# Patient Record
Sex: FEMALE | Race: WHITE | NOT HISPANIC OR LATINO
[De-identification: names, ages, dates, MRNs, and addresses within clinical notes are randomized per-mention and may not be internally consistent; named-entity substitution may affect disease eponyms.]

---

## 2022-04-11 ENCOUNTER — APPOINTMENT (OUTPATIENT)
Dept: ORTHOPEDIC SURGERY | Facility: CLINIC | Age: 72
End: 2022-04-11
Payer: MEDICARE

## 2022-04-11 VITALS — HEIGHT: 66 IN | BODY MASS INDEX: 47.09 KG/M2 | WEIGHT: 293 LBS

## 2022-04-11 PROCEDURE — 99213 OFFICE O/P EST LOW 20 MIN: CPT | Mod: 25

## 2022-04-11 PROCEDURE — 20611 DRAIN/INJ JOINT/BURSA W/US: CPT

## 2022-04-11 NOTE — PROCEDURE
[Large Joint Injection] : Large joint injection [Left] : of the left [Knee] : knee [Pain] : pain [Inflammation] : inflammation [X-ray evidence of Osteoarthritis on this or prior visit] : x-ray evidence of Osteoarthritis on this or prior visit [Alcohol] : alcohol [Betadine] : betadine [Ethyl Chloride sprayed topically] : ethyl chloride sprayed topically [Sterile technique used] : sterile technique used [___ cc    1%] : Lidocaine ~Vcc of 1%  [] : Patient tolerated procedure well [Call if redness, pain or fever occur] : call if redness, pain or fever occur [Apply ice for 15min out of every hour for the next 12-24 hours as tolerated] : apply ice for 15 minutes out of every hour for the next 12-24 hours as tolerated [Patient was advised to rest the joint(s) for ____ days] : patient was advised to rest the joint(s) for [unfilled] days [Previous OTC use and PT nontherapeutic] : patient has tried OTC's including aspirin, Ibuprofen, Aleve, etc or prescription NSAIDS, and/or exercises at home and/or physical therapy without satisfactory response [Risks, benefits, alternatives discussed / Verbal consent obtained] : the risks benefits, and alternatives have been discussed, and verbal consent was obtained [Prior failure or difficult injection] : prior failure or difficult injection [Altered anatomic landmarks d/t erosive arthritis] : altered anatomic landmarks d/t erosive arthritis [All ultrasound images have been permanently captured and stored accordingly in our picture archiving and communication system] : All ultrasound images have been permanently captured and stored accordingly in our picture archiving and communication system [Visualization of the needle and placement of injection was performed without complication] : visualization of the needle and placement of injection was performed without complication

## 2022-04-14 NOTE — DISCUSSION/SUMMARY
[de-identified] : The documentation recorded by the scribe accurately reflects the service I personally performed and the decisions made by me.\par I, Randall Nguyen, attest that this documentation has been prepared under the direction and in the presence of Provider Ga Wolfe MD.\par \par

## 2022-04-14 NOTE — REASON FOR VISIT
[FreeTextEntry2] : 4/11/22: follow up after finishing Euflexxa series back in NOV 2021. Interested in steroid injection. Patient aware to soon to restart visco injections.

## 2022-04-14 NOTE — HISTORY OF PRESENT ILLNESS
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

## 2022-04-14 NOTE — ASSESSMENT
[FreeTextEntry1] : 11/15/21: Xray of the left knee reveals adv global OA. \par Discussed options, tried CSI injection with no prolonged relief. \par recommended gel injections for OA left knee. \par 1st injection of euflexxa left knee tolerated well. Post injection instructions given. \par RTO 1 wk\par \par 11/22/21: 2nd euflexxa left knee. PT tolerated well.\par 11/29/21: 3rd euflexxa left knee\par \par Treatment options discussed. \par 4/11/22: 1st steroid injection left knee, advised of increased sugars.\par Apply ice to affected area.\par Waiting to start gel in the following 2 months. \par Questions addressed. \par \par \par

## 2022-08-01 ENCOUNTER — APPOINTMENT (OUTPATIENT)
Dept: ORTHOPEDIC SURGERY | Facility: CLINIC | Age: 72
End: 2022-08-01

## 2022-08-01 VITALS — WEIGHT: 293 LBS | HEIGHT: 66 IN | BODY MASS INDEX: 47.09 KG/M2

## 2022-08-01 PROCEDURE — 20610 DRAIN/INJ JOINT/BURSA W/O US: CPT

## 2022-08-01 PROCEDURE — 99213 OFFICE O/P EST LOW 20 MIN: CPT | Mod: 25

## 2022-08-01 NOTE — DISCUSSION/SUMMARY
[de-identified] : The documentation recorded by the scribe accurately reflects the service I personally performed and the decisions made by me.\par I, Randall Nguyen, attest that this documentation has been prepared under the direction and in the presence of Provider Ga Wolfe MD.\par \par

## 2022-08-01 NOTE — PROCEDURE
[Large Joint Injection] : Large joint injection [Left] : of the left [Knee] : knee [Pain] : pain [X-ray evidence of Osteoarthritis on this or prior visit] : x-ray evidence of Osteoarthritis on this or prior visit [Euflexxa] : Euflexxa [#1] : series #1 [] : Patient tolerated procedure well [Call if redness, pain or fever occur] : call if redness, pain or fever occur [Apply ice for 15min out of every hour for the next 12-24 hours as tolerated] : apply ice for 15 minutes out of every hour for the next 12-24 hours as tolerated [Patient was advised to rest the joint(s) for ____ days] : patient was advised to rest the joint(s) for [unfilled] days [Patient had decreased mobility in the joint] : patient had decreased mobility in the joint [Risks, benefits, alternatives discussed / Verbal consent obtained] : the risks benefits, and alternatives have been discussed, and verbal consent was obtained [de-identified] :  The site was prepped with alcohol, betadine, ethyl chloride sprayed topically and sterile technique used.

## 2022-08-01 NOTE — ASSESSMENT
[FreeTextEntry1] : 11/15/21: Xray of the left knee reveals adv global OA. \par Discussed options, tried CSI injection with no prolonged relief. \par recommended gel injections for OA left knee. \par 1st injection of euflexxa left knee tolerated well. Post injection instructions given. \par RTO 1 wk\par \par 11/22/21: 2nd euflexxa left knee. PT tolerated well.\par 11/29/21: 3rd euflexxa left knee\par \par Treatment options discussed. \par 4/11/22: 1st steroid injection left knee, advised of increased sugars.\par Apply ice to affected area.\par Waiting to start gel in the following 2 months. \par Questions addressed. \par \par Restarting euflexxa#1 left knee tolerated well today. \par \par \par

## 2022-08-01 NOTE — HISTORY OF PRESENT ILLNESS
[Gradual] : gradual [Tightness] : tightness [Full time] : Work status: full time [8] : 8 [Localized] : localized [] : no [FreeTextEntry1] : left knee [FreeTextEntry9] : voltaren [de-identified] : weather [de-identified] : takes an occasional Advil. 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

## 2022-08-09 ENCOUNTER — APPOINTMENT (OUTPATIENT)
Dept: ORTHOPEDIC SURGERY | Facility: CLINIC | Age: 72
End: 2022-08-09

## 2022-08-09 VITALS — HEIGHT: 66 IN | WEIGHT: 293 LBS | BODY MASS INDEX: 47.09 KG/M2

## 2022-08-09 DIAGNOSIS — E11.9 TYPE 2 DIABETES MELLITUS W/OUT COMPLICATIONS: ICD-10-CM

## 2022-08-09 PROCEDURE — 20611 DRAIN/INJ JOINT/BURSA W/US: CPT

## 2022-08-11 NOTE — DISCUSSION/SUMMARY
[de-identified] : The natural progression of osteoarthritis was explained to the patient.  We discussed the possible treatment options from conservative to operative.  These included NSAIDS, Glucosamine and Chondrotin sulfate, and Physical Therapy as well different types of injections.  We also discussed that at some point they may progress to needed a TKA.  Information and pamphlets were given.\par \par We discussed their diagnosis and treatment options at length including surgical and non-surgical options. We will first attempt conservative treatment with activity modification, PT, icing, weight loss, and anti-inflammatory medications. We discussed the possible of injections (steroid and viscosupplementation) in the future. The patient was provided with a PT prescription to work on ROM, hip ER/abductors strengthening, quad/hamstring stretches and strengthening, and other exercises on the Knee Arthritis Protocol.\par \par Dx / Natural History:\par The patient was advised of the diagnosis.  The natural history of the pathology was explained in full to the patient in layman's terms.  Several different treatment options were discussed and explained in full to the patient including the risks and benefits of both surgical and non-surgical treatments.  All questions and concerns were answered. \par \par Pain Guide Activities:\par The patient was advised to let pain guide the gradual advancement of activities.\par \par Physical Therapy:\par The patient was provided with a prescription for Physical Therapy.\par \par Icing:\par The patient was advised to apply ice (wrapped in a towel or protective covering) to the area daily (20 minutes at a time, 2-4X/day).

## 2022-08-11 NOTE — PROCEDURE
[Large Joint Injection] : Large joint injection [Left] : of the left [Knee] : knee [Pain] : pain [X-ray evidence of Osteoarthritis on this or prior visit] : x-ray evidence of Osteoarthritis on this or prior visit [Euflexxa] : Euflexxa [#2] : series #2 [] : Patient tolerated procedure well [Call if redness, pain or fever occur] : call if redness, pain or fever occur [Apply ice for 15min out of every hour for the next 12-24 hours as tolerated] : apply ice for 15 minutes out of every hour for the next 12-24 hours as tolerated [Patient was advised to rest the joint(s) for ____ days] : patient was advised to rest the joint(s) for [unfilled] days [Patient had decreased mobility in the joint] : patient had decreased mobility in the joint [Risks, benefits, alternatives discussed / Verbal consent obtained] : the risks benefits, and alternatives have been discussed, and verbal consent was obtained [Morbid obesity] : morbid obesity [Prior failure or difficult injection] : prior failure or difficult injection [Altered anatomic landmarks d/t erosive arthritis] : altered anatomic landmarks d/t erosive arthritis [All ultrasound images have been permanently captured and stored accordingly in our picture archiving and communication system] : All ultrasound images have been permanently captured and stored accordingly in our picture archiving and communication system [Visualization of the needle and placement of injection was performed without complication] : visualization of the needle and placement of injection was performed without complication [de-identified] :  The site was prepped with alcohol, betadine, ethyl chloride sprayed topically and sterile technique used.

## 2022-08-11 NOTE — PHYSICAL EXAM
[Left] : left knee [de-identified] : The patient is a well appearing 72 year year old female of their stated age.\par Patient ambulates with a normal gait.\par \par General: in no acute distress, seated comfortably, moving easily\par Skin: No discoloration, rashes; on palpation skin is dry, \par Neuro: Normal sensation all dermatomes, motor all myotomes\par Vascular: Normal pulses, no edema, normal temperature\par Coordination and balance: Normal\par Psych: normal mood and affect, non pressured speech, alert and oriented x3 [] : no erythema [TWNoteComboBox7] : flexion 125 degrees

## 2022-08-16 ENCOUNTER — APPOINTMENT (OUTPATIENT)
Dept: ORTHOPEDIC SURGERY | Facility: CLINIC | Age: 72
End: 2022-08-16

## 2022-08-16 VITALS — BODY MASS INDEX: 47.09 KG/M2 | WEIGHT: 293 LBS | HEIGHT: 66 IN

## 2022-08-16 PROCEDURE — 99213 OFFICE O/P EST LOW 20 MIN: CPT | Mod: 25

## 2022-08-16 PROCEDURE — 20610 DRAIN/INJ JOINT/BURSA W/O US: CPT

## 2022-08-16 NOTE — HISTORY OF PRESENT ILLNESS
[de-identified] : Patient is presenting today for Euflexxa Injection #[3]. Pain and symptoms are improving. Patient has been exercising, and taking anti-inflammatories as needed. Patient denies any numbness/tingling/fevers/chills.

## 2022-08-16 NOTE — PHYSICAL EXAM
[de-identified] : The patient is a well appearing 72 year year old female of their stated age.\par Patient ambulates with a normal gait.\par \par General: in no acute distress, seated comfortably, moving easily\par Skin: No discoloration, rashes; on palpation skin is dry, \par Neuro: Normal sensation all dermatomes, motor all myotomes\par Vascular: Normal pulses, no edema, normal temperature\par Coordination and balance: Normal\par Psych: normal mood and affect, non pressured speech, alert and oriented x3 [Left] : left knee [] : patient ambulates without assistive device [TWNoteComboBox7] : flexion 125 degrees

## 2022-08-16 NOTE — DISCUSSION/SUMMARY
[de-identified] : Pt tolerated procedure well, f/u in 1 week for Euflexxa #[4] \par \par All of the patient's questions were answered to Her satisfaction. Diagnoses and potential treatments were reviewed. She agreed with the plan and would like to move forward with it. \par \par Curtis Longoria acting as scribe.

## 2022-08-16 NOTE — PROCEDURE
[Large Joint Injection] : Large joint injection [Left] : of the left [Knee] : knee [Pain] : pain [X-ray evidence of Osteoarthritis on this or prior visit] : x-ray evidence of Osteoarthritis on this or prior visit [Euflexxa] : Euflexxa [#3] : series #3 [] : Patient tolerated procedure well [Call if redness, pain or fever occur] : call if redness, pain or fever occur [Apply ice for 15min out of every hour for the next 12-24 hours as tolerated] : apply ice for 15 minutes out of every hour for the next 12-24 hours as tolerated [Patient was advised to rest the joint(s) for ____ days] : patient was advised to rest the joint(s) for [unfilled] days [Patient had decreased mobility in the joint] : patient had decreased mobility in the joint [Risks, benefits, alternatives discussed / Verbal consent obtained] : the risks benefits, and alternatives have been discussed, and verbal consent was obtained [Morbid obesity] : morbid obesity [Prior failure or difficult injection] : prior failure or difficult injection [Altered anatomic landmarks d/t erosive arthritis] : altered anatomic landmarks d/t erosive arthritis [All ultrasound images have been permanently captured and stored accordingly in our picture archiving and communication system] : All ultrasound images have been permanently captured and stored accordingly in our picture archiving and communication system [Visualization of the needle and placement of injection was performed without complication] : visualization of the needle and placement of injection was performed without complication [de-identified] :  The site was prepped with alcohol, betadine, ethyl chloride sprayed topically and sterile technique used.

## 2023-04-24 ENCOUNTER — APPOINTMENT (OUTPATIENT)
Dept: ORTHOPEDIC SURGERY | Facility: CLINIC | Age: 73
End: 2023-04-24
Payer: MEDICARE

## 2023-04-24 VITALS — BODY MASS INDEX: 47.09 KG/M2 | HEIGHT: 66 IN | WEIGHT: 293 LBS

## 2023-04-24 PROCEDURE — 99214 OFFICE O/P EST MOD 30 MIN: CPT | Mod: 25

## 2023-04-24 PROCEDURE — 20610 DRAIN/INJ JOINT/BURSA W/O US: CPT | Mod: 50

## 2023-04-24 PROCEDURE — 73564 X-RAY EXAM KNEE 4 OR MORE: CPT | Mod: FY,50

## 2023-04-24 RX ORDER — MELOXICAM 15 MG/1
15 TABLET ORAL
Refills: 0 | Status: ACTIVE | COMMUNITY

## 2023-04-24 NOTE — ASSESSMENT
[FreeTextEntry1] : 11/15/21: Xray of the left knee reveals adv global OA. \par Discussed options, tried CSI injection with no prolonged relief. \par recommended gel injections for OA left knee. \par 1st injection of euflexxa left knee tolerated well. Post injection instructions given. \par RTO 1 wk\par \par 11/22/21: 2nd euflexxa left knee. PT tolerated well.\par 11/29/21: 3rd euflexxa left knee\par \par Treatment options discussed. \par 4/11/22: 1st steroid injection left knee, advised of increased sugars.\par Apply ice to affected area.\par Waiting to start gel in the following 2 months. \par Questions addressed. \par \par 4/24/2023: X-rays today - moderate bilat tricomp OA with medial space narrowing\par Bilateral euflexxa#1 tolerated well today. \par Questions answered. \par Apply ice to the knee. \par \par \par

## 2023-04-24 NOTE — DISCUSSION/SUMMARY
[de-identified] : Progress note completed by ALIZA Delong under the direct supervision of Ga Wolfe M.D.\par

## 2023-04-24 NOTE — PHYSICAL EXAM
[Left] : left knee [Right] : right knee [NL (0)] : extension 0 degrees [] : light touch is intact throughout [TWNoteComboBox7] : flexion 120 degrees

## 2023-04-24 NOTE — HISTORY OF PRESENT ILLNESS
[8] : 8 [Dull/Aching] : dull/aching [Localized] : localized [Shooting] : shooting [Gradual] : gradual [de-identified] : 4/24/23: Follow up left knee. She completed Euflexxa injections (8/16/23) with moderate relief. Now notes similar symptoms in the right knee. No new injury. She was prescribed Mobic by her PCP with some relief. [] : no [FreeTextEntry5] : rt knee pain for a month , no injury [FreeTextEntry9] : voltaren or mobic [de-identified] : getting up from a seated position

## 2023-04-24 NOTE — PROCEDURE
[Large Joint Injection] : Large joint injection [Bilateral] : bilaterally of the [Knee] : knee [Pain] : pain [X-ray evidence of Osteoarthritis on this or prior visit] : x-ray evidence of Osteoarthritis on this or prior visit [Repeat series performed] : repeat series performed [Alcohol] : alcohol [Betadine] : betadine [Ethyl Chloride sprayed topically] : ethyl chloride sprayed topically [Sterile technique used] : sterile technique used [Euflexxa(20mg)] : 20mg of Euflexxa [#1] : series #1 [] : Patient tolerated procedure well [Call if redness, pain or fever occur] : call if redness, pain or fever occur [Apply ice for 15min out of every hour for the next 12-24 hours as tolerated] : apply ice for 15 minutes out of every hour for the next 12-24 hours as tolerated [Patient was advised to rest the joint(s) for ____ days] : patient was advised to rest the joint(s) for [unfilled] days [Patient had decreased mobility in the joint] : patient had decreased mobility in the joint [Risks, benefits, alternatives discussed / Verbal consent obtained] : the risks benefits, and alternatives have been discussed, and verbal consent was obtained [FreeTextEntry3] : Viscosupplementation Injection: X-ray evidence of osteoarthritis on this or prior visit and patient has tried OTC's including aspirin, Ibuprofen, Aleve etc or prescription NSAIDS, and/or exercises at home and/ or physical therapy without satisfactory response. \par \par An injection of Euflexxa 2.5ml #1 was injected into the bilateral knees after verbal consent obtained. \par \par Patient has tried OTC's including aspirin, Ibuprofen, Aleve etc or prescription NSAIDS, and/or exercises at home and/ or physical therapy without satisfactory response and Patient has decreased mobility in the joint. The risks, benefits, and alternatives to cortisone injection were explained in full to the patient. Risks outlined include but are not limited to infection, sepsis, bleeding, scarring, skin discoloration, temporary increase in pain, syncopal episode, failure to resolve symptoms, allergic reaction, and symptom recurrence. Patient understands the risks. All questions were answered. After discussion of options, patient requested an injection. Oral informed consent was obtained. Sterile technique was utilized for the procedure including the preparation of the solutions used for the injection. Injection site was prepped with betadine and alcohol. Ethyl chloride sprayed topically. Sterile technique used. Patient tolerated procedure well. \par \par Post Procedure Instructions: Patient was advised to call if redness, pain, or fever occur. Apply ice for 15 min. every 2 hours for the next 12-24 hours as tolerated. Patient was advised to rest the joint(s) for 1-2 days.\par

## 2023-05-01 ENCOUNTER — APPOINTMENT (OUTPATIENT)
Dept: ORTHOPEDIC SURGERY | Facility: CLINIC | Age: 73
End: 2023-05-01
Payer: MEDICARE

## 2023-05-01 VITALS — HEIGHT: 66 IN | BODY MASS INDEX: 47.09 KG/M2 | WEIGHT: 293 LBS

## 2023-05-01 PROCEDURE — 99024 POSTOP FOLLOW-UP VISIT: CPT

## 2023-05-01 PROCEDURE — 20610 DRAIN/INJ JOINT/BURSA W/O US: CPT | Mod: 50

## 2023-05-01 NOTE — DISCUSSION/SUMMARY
[de-identified] : Progress note completed by ALIZA Delong under the direct supervision of Ga Wolfe M.D.\par

## 2023-05-01 NOTE — ASSESSMENT
[FreeTextEntry1] : 11/15/21: Xray of the left knee reveals adv global OA. \par Discussed options, tried CSI injection with no prolonged relief. \par recommended gel injections for OA left knee. \par 1st injection of euflexxa left knee tolerated well. Post injection instructions given. \par RTO 1 wk\par \par 11/22/21: 2nd euflexxa left knee. PT tolerated well.\par 11/29/21: 3rd euflexxa left knee\par \par Treatment options discussed. \par 4/11/22: 1st steroid injection left knee, advised of increased sugars.\par Apply ice to affected area.\par Waiting to start gel in the following 2 months. \par Questions addressed. \par \par 4/24/2023: X-rays today - moderate bilat tricomp OA with medial space narrowing\par Bilateral euflexxa#1 tolerated well today. \par Questions answered. \par Apply ice to the knee. \par \par 5/1/23: Euflexxa #2 bilateral knees tolerated well.\par RTO in 1 week to continue.\par \par \par

## 2023-05-01 NOTE — HISTORY OF PRESENT ILLNESS
[Gradual] : gradual [8] : 8 [Dull/Aching] : dull/aching [Localized] : localized [Meds] : meds [Walking/activity] : walking/activity [Sitting] : sitting [Standing] : standing [Walking] : walking [Stairs] : stairs [2] : 2 [Euflexxa] : Euflexxa [de-identified] : 5/1/23: Euflexxa #2 bilateral knees. Symptoms continue. Denies complication with prior injection.\par \par 4/24/23: Follow up left knee. She completed Euflexxa injections (8/16/23) with moderate relief. Now notes similar symptoms in the right knee. No new injury. She was prescribed Mobic by her PCP with some relief. [] : no [FreeTextEntry1] : bilat knees [FreeTextEntry5] : rt knee pain for a month , no injury [FreeTextEntry9] : voltaren  [de-identified] : getting up from a seated position [de-identified] : 4-24-23 [de-identified] : knees

## 2023-05-01 NOTE — PHYSICAL EXAM
[Left] : left knee [Right] : right knee [NL (0)] : extension 0 degrees [] : no erythema [TWNoteComboBox7] : flexion 120 degrees

## 2023-05-08 ENCOUNTER — APPOINTMENT (OUTPATIENT)
Dept: ORTHOPEDIC SURGERY | Facility: CLINIC | Age: 73
End: 2023-05-08
Payer: MEDICARE

## 2023-05-08 VITALS — BODY MASS INDEX: 47.09 KG/M2 | WEIGHT: 293 LBS | HEIGHT: 66 IN

## 2023-05-08 DIAGNOSIS — M17.11 UNILATERAL PRIMARY OSTEOARTHRITIS, RIGHT KNEE: ICD-10-CM

## 2023-05-08 DIAGNOSIS — M17.12 UNILATERAL PRIMARY OSTEOARTHRITIS, LEFT KNEE: ICD-10-CM

## 2023-05-08 PROCEDURE — 20610 DRAIN/INJ JOINT/BURSA W/O US: CPT | Mod: 50

## 2023-05-08 PROCEDURE — 99213 OFFICE O/P EST LOW 20 MIN: CPT | Mod: 25

## 2023-05-08 NOTE — HISTORY OF PRESENT ILLNESS
[3] : 3 [Euflexxa] : Euflexxa [8] : 8 [Localized] : localized [Walking/activity] : walking/activity [Nothing helps with pain getting better] : Nothing helps with pain getting better [Stairs] : stairs [de-identified] : 5/8/23: Euflexxa #3 bilateral knees. Mild improvement after the last inj\par \par 5/1/23: Euflexxa #2 bilateral knees. Symptoms continue. Denies complication with prior injection.\par \par 4/24/23: Follow up left knee. She completed Euflexxa injections (8/16/23) with moderate relief. Now notes similar symptoms in the right knee. No new injury. She was prescribed Mobic by her PCP with some relief. [] : no [FreeTextEntry1] : knees [de-identified] : 5/1/2023 [de-identified] : B/L knees

## 2023-05-08 NOTE — ASSESSMENT
[FreeTextEntry1] : 11/15/21: Xray of the left knee reveals adv global OA. \par Discussed options, tried CSI injection with no prolonged relief. \par recommended gel injections for OA left knee. \par 1st injection of euflexxa left knee tolerated well. Post injection instructions given. \par RTO 1 wk\par \par 11/22/21: 2nd euflexxa left knee. PT tolerated well.\par 11/29/21: 3rd euflexxa left knee\par \par Treatment options discussed. \par 4/11/22: 1st steroid injection left knee, advised of increased sugars.\par Apply ice to affected area.\par Waiting to start gel in the following 2 months. \par Questions addressed. \par \par 4/24/2023: X-rays today - moderate bilat tricomp OA with medial space narrowing\par Bilateral euflexxa#1 tolerated well today. \par \par 5/1/23: Euflexxa #2 bilateral knees tolerated well.\par \par 05/08/2023 euflexxa #3 B/L knees\par Apply ice to affected area.\par RTO in 6 months or greater to repeat series. \par \par Progress note completed by Elena Smith PA-C\par The PA-C assigned on this date saw this patient independently.  I have reviewed the note and agree with the treatment provided. - Dr. Wolfe

## 2023-05-08 NOTE — PROCEDURE
[FreeTextEntry3] : Viscosupplementation Injection: X-ray evidence of osteoarthritis on this or prior visit and patient has tried OTC's including aspirin, Ibuprofen, Aleve etc or prescription NSAIDS, and/or exercises at home and/ or physical therapy without satisfactory response. \par \par An injection of Euflexxa 2.5ml #3 was injected into the bilateral knees after verbal consent obtained. \par \par Patient has tried OTC's including aspirin, Ibuprofen, Aleve etc or prescription NSAIDS, and/or exercises at home and/ or physical therapy without satisfactory response and Patient has decreased mobility in the joint. The risks, benefits, and alternatives to cortisone injection were explained in full to the patient. Risks outlined include but are not limited to infection, sepsis, bleeding, scarring, skin discoloration, temporary increase in pain, syncopal episode, failure to resolve symptoms, allergic reaction, and symptom recurrence. Patient understands the risks. All questions were answered. After discussion of options, patient requested an injection. Oral informed consent was obtained. Sterile technique was utilized for the procedure including the preparation of the solutions used for the injection. Injection site was prepped with betadine and alcohol. Ethyl chloride sprayed topically. Sterile technique used. Patient tolerated procedure well. \par \par Post Procedure Instructions: Patient was advised to call if redness, pain, or fever occur. Apply ice for 15 min. every 2 hours for the next 12-24 hours as tolerated. Patient was advised to rest the joint(s) for 1-2 days.\par

## 2024-10-30 ENCOUNTER — APPOINTMENT (OUTPATIENT)
Dept: ORTHOPEDIC SURGERY | Facility: CLINIC | Age: 74
End: 2024-10-30
Payer: MEDICARE

## 2024-10-30 VITALS — HEIGHT: 66 IN | BODY MASS INDEX: 45.8 KG/M2 | WEIGHT: 285 LBS

## 2024-10-30 DIAGNOSIS — M41.50 OTHER SECONDARY SCOLIOSIS, SITE UNSPECIFIED: ICD-10-CM

## 2024-10-30 DIAGNOSIS — M54.16 RADICULOPATHY, LUMBAR REGION: ICD-10-CM

## 2024-10-30 PROCEDURE — 99213 OFFICE O/P EST LOW 20 MIN: CPT

## 2024-10-30 PROCEDURE — 72170 X-RAY EXAM OF PELVIS: CPT

## 2024-10-30 PROCEDURE — 72100 X-RAY EXAM L-S SPINE 2/3 VWS: CPT

## 2024-10-30 RX ORDER — AMLODIPINE BESYLATE 5 MG/1
5 TABLET ORAL
Refills: 0 | Status: ACTIVE | COMMUNITY

## 2024-10-30 RX ORDER — CARVEDILOL 12.5 MG/1
12.5 TABLET, FILM COATED ORAL
Refills: 0 | Status: ACTIVE | COMMUNITY

## 2024-10-30 RX ORDER — ESCITALOPRAM OXALATE 5 MG/1
5 TABLET, FILM COATED ORAL
Refills: 0 | Status: ACTIVE | COMMUNITY

## 2024-10-30 RX ORDER — LOSARTAN POTASSIUM 50 MG/1
50 TABLET, FILM COATED ORAL
Refills: 0 | Status: ACTIVE | COMMUNITY

## 2025-02-26 ENCOUNTER — APPOINTMENT (OUTPATIENT)
Dept: ORTHOPEDIC SURGERY | Facility: CLINIC | Age: 75
End: 2025-02-26
Payer: MEDICARE

## 2025-02-26 VITALS — WEIGHT: 285 LBS | HEIGHT: 66 IN | BODY MASS INDEX: 45.8 KG/M2

## 2025-02-26 DIAGNOSIS — R29.818 OTHER SYMPTOMS AND SIGNS INVOLVING THE NERVOUS SYSTEM: ICD-10-CM

## 2025-02-26 DIAGNOSIS — M54.16 RADICULOPATHY, LUMBAR REGION: ICD-10-CM

## 2025-02-26 DIAGNOSIS — M41.50 OTHER SECONDARY SCOLIOSIS, SITE UNSPECIFIED: ICD-10-CM

## 2025-02-26 DIAGNOSIS — E66.01 MORBID (SEVERE) OBESITY DUE TO EXCESS CALORIES: ICD-10-CM

## 2025-02-26 PROCEDURE — 99213 OFFICE O/P EST LOW 20 MIN: CPT

## 2025-03-08 ENCOUNTER — RESULT REVIEW (OUTPATIENT)
Age: 75
End: 2025-03-08

## 2025-03-26 ENCOUNTER — APPOINTMENT (OUTPATIENT)
Dept: ORTHOPEDIC SURGERY | Facility: CLINIC | Age: 75
End: 2025-03-26

## 2025-03-26 VITALS — WEIGHT: 285 LBS | BODY MASS INDEX: 45.8 KG/M2 | HEIGHT: 66 IN

## 2025-03-26 DIAGNOSIS — M47.816 SPONDYLOSIS W/OUT MYELOPATHY OR RADICULOPATHY, LUMBAR REGION: ICD-10-CM

## 2025-03-26 DIAGNOSIS — M41.50 OTHER SECONDARY SCOLIOSIS, SITE UNSPECIFIED: ICD-10-CM

## 2025-03-26 DIAGNOSIS — E66.01 MORBID (SEVERE) OBESITY DUE TO EXCESS CALORIES: ICD-10-CM

## 2025-03-26 DIAGNOSIS — M54.16 RADICULOPATHY, LUMBAR REGION: ICD-10-CM

## 2025-03-26 DIAGNOSIS — M48.061 SPINAL STENOSIS, LUMBAR REGION WITHOUT NEUROGENIC CLAUDICATION: ICD-10-CM

## 2025-03-26 PROCEDURE — 99214 OFFICE O/P EST MOD 30 MIN: CPT

## 2025-04-17 ENCOUNTER — APPOINTMENT (OUTPATIENT)
Dept: PAIN MANAGEMENT | Facility: CLINIC | Age: 75
End: 2025-04-17
Payer: MEDICARE

## 2025-04-17 VITALS — HEIGHT: 66 IN | WEIGHT: 285 LBS | BODY MASS INDEX: 45.8 KG/M2

## 2025-04-17 DIAGNOSIS — M54.16 RADICULOPATHY, LUMBAR REGION: ICD-10-CM

## 2025-04-17 PROCEDURE — 99204 OFFICE O/P NEW MOD 45 MIN: CPT

## 2025-05-08 ENCOUNTER — APPOINTMENT (OUTPATIENT)
Dept: INFECTIOUS DISEASE | Facility: CLINIC | Age: 75
End: 2025-05-08

## 2025-05-08 VITALS
SYSTOLIC BLOOD PRESSURE: 120 MMHG | HEIGHT: 66 IN | TEMPERATURE: 97.3 F | WEIGHT: 250 LBS | BODY MASS INDEX: 40.18 KG/M2 | DIASTOLIC BLOOD PRESSURE: 70 MMHG

## 2025-05-08 VITALS — OXYGEN SATURATION: 93 % | HEART RATE: 81 BPM

## 2025-05-08 DIAGNOSIS — M46.40 DISCITIS, UNSPECIFIED, SITE UNSPECIFIED: ICD-10-CM

## 2025-05-08 DIAGNOSIS — M54.16 RADICULOPATHY, LUMBAR REGION: ICD-10-CM

## 2025-05-08 DIAGNOSIS — R93.7 ABNORMAL FINDINGS ON DIAGNOSTIC IMAGING OF OTHER PARTS OF MUSCULOSKELETAL SYSTEM: ICD-10-CM

## 2025-05-08 PROCEDURE — G2211 COMPLEX E/M VISIT ADD ON: CPT

## 2025-05-08 PROCEDURE — 99205 OFFICE O/P NEW HI 60 MIN: CPT

## 2025-05-14 LAB
ALBUMIN SERPL ELPH-MCNC: 4 G/DL
ALP BLD-CCNC: 109 U/L
ALT SERPL-CCNC: 30 U/L
ANION GAP SERPL CALC-SCNC: 15 MMOL/L
AST SERPL-CCNC: 24 U/L
BACTERIA BLD CULT: NORMAL
BACTERIA BLD CULT: NORMAL
BASOPHILS # BLD AUTO: 0.02 K/UL
BASOPHILS NFR BLD AUTO: 0.4 %
BILIRUB SERPL-MCNC: 0.4 MG/DL
BUN SERPL-MCNC: 44 MG/DL
CALCIUM SERPL-MCNC: 9.3 MG/DL
CHLORIDE SERPL-SCNC: 104 MMOL/L
CO2 SERPL-SCNC: 20 MMOL/L
CREAT SERPL-MCNC: 1.66 MG/DL
CRP SERPL-MCNC: 9 MG/L
EGFRCR SERPLBLD CKD-EPI 2021: 32 ML/MIN/1.73M2
EOSINOPHIL # BLD AUTO: 0.08 K/UL
EOSINOPHIL NFR BLD AUTO: 1.5 %
ERYTHROCYTE [SEDIMENTATION RATE] IN BLOOD BY WESTERGREN METHOD: 50 MM/HR
HCT VFR BLD CALC: 38.5 %
HGB BLD-MCNC: 12.3 G/DL
IMM GRANULOCYTES NFR BLD AUTO: 0.4 %
LYMPHOCYTES # BLD AUTO: 0.73 K/UL
LYMPHOCYTES NFR BLD AUTO: 13.7 %
MAN DIFF?: NORMAL
MCHC RBC-ENTMCNC: 31.1 PG
MCHC RBC-ENTMCNC: 31.9 G/DL
MCV RBC AUTO: 97.2 FL
MONOCYTES # BLD AUTO: 0.41 K/UL
MONOCYTES NFR BLD AUTO: 7.7 %
NEUTROPHILS # BLD AUTO: 4.05 K/UL
NEUTROPHILS NFR BLD AUTO: 76.3 %
PLATELET # BLD AUTO: 195 K/UL
POTASSIUM SERPL-SCNC: 5 MMOL/L
PROCALCITONIN SERPL-MCNC: 0.08 NG/ML
PROT SERPL-MCNC: 6.5 G/DL
RBC # BLD: 3.96 M/UL
RBC # FLD: 14.2 %
SODIUM SERPL-SCNC: 139 MMOL/L
WBC # FLD AUTO: 5.31 K/UL

## 2025-05-15 ENCOUNTER — APPOINTMENT (OUTPATIENT)
Dept: PAIN MANAGEMENT | Facility: CLINIC | Age: 75
End: 2025-05-15

## 2025-05-22 ENCOUNTER — APPOINTMENT (OUTPATIENT)
Dept: NUCLEAR MEDICINE | Facility: CLINIC | Age: 75
End: 2025-05-22
Payer: MEDICARE

## 2025-05-22 ENCOUNTER — OUTPATIENT (OUTPATIENT)
Dept: OUTPATIENT SERVICES | Facility: HOSPITAL | Age: 75
LOS: 1 days | End: 2025-05-22

## 2025-05-22 DIAGNOSIS — R93.7 ABNORMAL FINDINGS ON DIAGNOSTIC IMAGING OF OTHER PARTS OF MUSCULOSKELETAL SYSTEM: ICD-10-CM

## 2025-05-22 PROCEDURE — 78816 PET IMAGE W/CT FULL BODY: CPT | Mod: 26,PI

## 2025-05-22 PROCEDURE — 78816 PET IMAGE W/CT FULL BODY: CPT | Mod: 26

## 2025-06-05 ENCOUNTER — APPOINTMENT (OUTPATIENT)
Dept: PAIN MANAGEMENT | Facility: CLINIC | Age: 75
End: 2025-06-05
Payer: MEDICARE

## 2025-06-05 VITALS — HEIGHT: 66 IN | WEIGHT: 250 LBS | BODY MASS INDEX: 40.18 KG/M2

## 2025-06-05 PROCEDURE — G2211 COMPLEX E/M VISIT ADD ON: CPT

## 2025-06-05 PROCEDURE — 99214 OFFICE O/P EST MOD 30 MIN: CPT

## 2025-06-09 ENCOUNTER — APPOINTMENT (OUTPATIENT)
Dept: PAIN MANAGEMENT | Facility: CLINIC | Age: 75
End: 2025-06-09
Payer: MEDICARE

## 2025-06-09 PROCEDURE — 64484 NJX AA&/STRD TFRM EPI L/S EA: CPT | Mod: RT,59

## 2025-06-09 PROCEDURE — 64483 NJX AA&/STRD TFRM EPI L/S 1: CPT | Mod: RT

## 2025-06-27 ENCOUNTER — APPOINTMENT (OUTPATIENT)
Dept: PAIN MANAGEMENT | Facility: CLINIC | Age: 75
End: 2025-06-27
Payer: MEDICARE

## 2025-06-27 VITALS — HEIGHT: 66 IN | BODY MASS INDEX: 40.18 KG/M2 | WEIGHT: 250 LBS

## 2025-06-27 PROCEDURE — 99214 OFFICE O/P EST MOD 30 MIN: CPT

## 2025-06-27 PROCEDURE — G2211 COMPLEX E/M VISIT ADD ON: CPT

## 2025-07-01 ENCOUNTER — APPOINTMENT (OUTPATIENT)
Dept: PAIN MANAGEMENT | Facility: CLINIC | Age: 75
End: 2025-07-01
Payer: MEDICARE

## 2025-07-01 PROCEDURE — 62323 NJX INTERLAMINAR LMBR/SAC: CPT

## 2025-07-24 ENCOUNTER — APPOINTMENT (OUTPATIENT)
Dept: PAIN MANAGEMENT | Facility: CLINIC | Age: 75
End: 2025-07-24
Payer: MEDICARE

## 2025-07-24 VITALS — HEIGHT: 66 IN | BODY MASS INDEX: 40.18 KG/M2 | WEIGHT: 250 LBS

## 2025-07-24 DIAGNOSIS — M47.816 SPONDYLOSIS W/OUT MYELOPATHY OR RADICULOPATHY, LUMBAR REGION: ICD-10-CM

## 2025-07-24 PROCEDURE — 99214 OFFICE O/P EST MOD 30 MIN: CPT

## 2025-07-24 PROCEDURE — G2211 COMPLEX E/M VISIT ADD ON: CPT

## 2025-08-27 ENCOUNTER — APPOINTMENT (OUTPATIENT)
Dept: PAIN MANAGEMENT | Facility: CLINIC | Age: 75
End: 2025-08-27
Payer: MEDICARE

## 2025-08-27 DIAGNOSIS — M47.816 SPONDYLOSIS W/OUT MYELOPATHY OR RADICULOPATHY, LUMBAR REGION: ICD-10-CM

## 2025-08-27 PROCEDURE — 64493 INJ PARAVERT F JNT L/S 1 LEV: CPT | Mod: 50

## 2025-08-27 PROCEDURE — J3490M: CUSTOM | Mod: NC

## 2025-08-27 PROCEDURE — 64494 INJ PARAVERT F JNT L/S 2 LEV: CPT | Mod: 50,59

## 2025-09-11 ENCOUNTER — APPOINTMENT (OUTPATIENT)
Dept: PAIN MANAGEMENT | Facility: CLINIC | Age: 75
End: 2025-09-11
Payer: MEDICARE

## 2025-09-11 VITALS — WEIGHT: 250 LBS | HEIGHT: 66 IN | BODY MASS INDEX: 40.18 KG/M2

## 2025-09-11 DIAGNOSIS — M47.816 SPONDYLOSIS W/OUT MYELOPATHY OR RADICULOPATHY, LUMBAR REGION: ICD-10-CM

## 2025-09-11 PROCEDURE — G2211 COMPLEX E/M VISIT ADD ON: CPT

## 2025-09-11 PROCEDURE — 99214 OFFICE O/P EST MOD 30 MIN: CPT
